# Patient Record
Sex: MALE | Race: OTHER | Employment: FULL TIME | URBAN - METROPOLITAN AREA
[De-identification: names, ages, dates, MRNs, and addresses within clinical notes are randomized per-mention and may not be internally consistent; named-entity substitution may affect disease eponyms.]

---

## 2018-09-11 ENCOUNTER — HOSPITAL ENCOUNTER (EMERGENCY)
Age: 33
Discharge: HOME | End: 2018-09-11
Attending: STUDENT IN AN ORGANIZED HEALTH CARE EDUCATION/TRAINING PROGRAM
Payer: SELF-PAY

## 2018-09-11 ENCOUNTER — APPOINTMENT (OUTPATIENT)
Dept: GENERAL RADIOLOGY | Age: 33
End: 2018-09-11
Attending: PHYSICIAN ASSISTANT
Payer: SELF-PAY

## 2018-09-11 ENCOUNTER — APPOINTMENT (OUTPATIENT)
Dept: CT IMAGING | Age: 33
End: 2018-09-11
Attending: EMERGENCY MEDICINE
Payer: SELF-PAY

## 2018-09-11 VITALS
DIASTOLIC BLOOD PRESSURE: 76 MMHG | HEART RATE: 57 BPM | OXYGEN SATURATION: 100 % | WEIGHT: 170 LBS | TEMPERATURE: 97.9 F | SYSTOLIC BLOOD PRESSURE: 128 MMHG | RESPIRATION RATE: 16 BRPM

## 2018-09-11 DIAGNOSIS — M54.50 ACUTE BILATERAL LOW BACK PAIN WITHOUT SCIATICA: Primary | ICD-10-CM

## 2018-09-11 PROCEDURE — 72131 CT LUMBAR SPINE W/O DYE: CPT

## 2018-09-11 PROCEDURE — 72100 X-RAY EXAM L-S SPINE 2/3 VWS: CPT

## 2018-09-11 PROCEDURE — 96372 THER/PROPH/DIAG INJ SC/IM: CPT

## 2018-09-11 PROCEDURE — 74011250636 HC RX REV CODE- 250/636: Performed by: EMERGENCY MEDICINE

## 2018-09-11 PROCEDURE — 99282 EMERGENCY DEPT VISIT SF MDM: CPT

## 2018-09-11 RX ORDER — KETOROLAC TROMETHAMINE 30 MG/ML
60 INJECTION, SOLUTION INTRAMUSCULAR; INTRAVENOUS
Status: COMPLETED | OUTPATIENT
Start: 2018-09-11 | End: 2018-09-11

## 2018-09-11 RX ORDER — HYDROCODONE BITARTRATE AND ACETAMINOPHEN 5; 325 MG/1; MG/1
1 TABLET ORAL
Qty: 10 TAB | Refills: 0 | Status: SHIPPED | OUTPATIENT
Start: 2018-09-11

## 2018-09-11 RX ORDER — NAPROXEN 500 MG/1
500 TABLET ORAL 2 TIMES DAILY WITH MEALS
Qty: 20 TAB | Refills: 0 | Status: SHIPPED | OUTPATIENT
Start: 2018-09-11

## 2018-09-11 RX ADMIN — KETOROLAC TROMETHAMINE 60 MG: 30 INJECTION, SOLUTION INTRAMUSCULAR at 15:47

## 2018-09-11 NOTE — LETTER
[address] 
[address] [facility] 7 [id number] 
[phone] Work/School Note Date: 9/11/2018 To Whom It May concern: Vineet Cortez was seen and treated today in the emergency room by the following provider(s): 
Attending Provider: Negro Slater MD 
Physician Assistant: Sasha Anaya PA-C. Vineet Cortez may return to work on 9/14/18.  
 
Sincerely, 
 
 
 
 
Sasha Anaya PA-C

## 2018-09-11 NOTE — DISCHARGE INSTRUCTIONS
Distensión de la espalda: Instrucciones de cuidado - [ Back Strain: Care Instructions ]  Instrucciones de cuidado    La distensión de la espalda ocurre cuando usted estira demasiado, o fuerza, un músculo de la espalda. Usted puede lesionarse la espalda en un accidente o cuando hace ejercicio o levanta algo. La mayoría de los dawna de espalda mejoran con reposo y Johnny. Usted puede cuidarse en el hogar para ayudar a que michelle espalda sane. La atención de seguimiento es darlyn parte clave de michelle tratamiento y seguridad. Asegúrese de hacer y acudir a todas las citas, y llame a michelle médico si está teniendo problemas. También es darlyn buena idea saber los resultados de chino exámenes y mantener darlyn lista de los medicamentos que james. ¿Cómo puede cuidarse en el hogar? · Trate de permanecer tan activo tony pueda, junior deje de hacer o reduzca cualquier actividad que le produzca dolor. · Colóquese hielo o darlyn compresa fría en el músculo adolorido de 10 a 20 minutos cada vez para detener la hinchazón. Trate de hacerlo cada 1 o 2 horas kashif 3 días (cuando esté despierto) o hasta que la hinchazón baje. Póngase un paño barker entre el hielo y la piel. · Después de 2 o 3 días, coloque darlyn almohadilla térmica ajustada a baja temperatura o un paño tibio sobre la espalda. Algunos médicos sugieren que se alterne entre tratamientos calientes y fríos. · [employer] analgésicos (medicamentos para el dolor) exactamente según las indicaciones. ¨ Si el médico le recetó un analgésico, tómelo según las indicaciones. ¨ Si no está tomando un analgésico recetado, pregúntele a michelle médico si puede jose jamil de [employer]. · Trate de dormir de lado con darlyn almohada entre las piernas. O, colóquese darlyn almohada debajo de las rodillas cuando se acueste [place] Estas medidas pueden aliviar el dolor en la parte baja de la espalda. · Beula Hakim a poco a michelle nivel habitual de actividades. ¿Cuándo debe pedir ayuda?   Llame al 911 en cualquier momento que considere que necesita atención de [place]. Por ejemplo, llame si:    · Es absolutamente incapaz de  darlyn pierna.    Llame a michelle médico ahora mismo o busque atención médica inmediata si:    · Tiene síntomas nuevos o peores en las piernas, el abdomen o las nalgas. Los síntomas pueden incluir:  ¨ Entumecimiento u hormigueo. ¨ Debilidad. ¨ Dolor.     · Pierde el control de la vejiga o los intestinos.    Preste especial atención a los cambios en michelle james y asegúrese de comunicarse con michelle médico si:    · Tiene fiebre, pierde peso o no se siente lavonne.     · No mejora tony se esperaba. ¿Dónde puede encontrar más información en inglés? Jean Mosher a http://eileen-melody.info/. Madan Elam L945 en la búsqueda para aprender más acerca de \"Distensión de la espalda: Instrucciones de cuidado - [ Back Strain: Care Instructions ]. \"  Revisado: 29 noviembre, 2017  Versión del contenido: 11.7  © 9574-6185 Healthwise, Incorporated. Las instrucciones de cuidado fueron adaptadas bajo licencia por [employer] (which disclaims liability or warranty for this information). Si usted tiene [place] afección médica o sobre estas instrucciones, siempre pregunte a michelle profesional de james. Healthwise, Incorporated niega toda garantía o responsabilidad por michelle uso de esta información. Dolor de espalda, síntomas urgentes o de emergencia: Instrucciones de cuidado - [ Back Pain, Emergency or Urgent Symptoms: Care Instructions ]  Instrucciones de cuidado    Muchas personas tienen dolor de espalda en algún momento. En la [employer], el dolor mejora con los cuidados personales, lo que incluye analgésicos (medicamentos para el dolor) de [employer], hielo, calor y ejercicios. A menos que tenga síntomas de darlyn lesión grave o de ataque al corazón, es posible que pueda dejar pasar algunos días antes de llamar al médico. Scarlet algunos problemas de espalda son muy graves.  No ignore los síntomas que deberían ser revisados de inmediato. La atención de seguimiento es darlyn parte clave de michelle tratamiento y seguridad. Asegúrese de hacer y acudir a todas las citas, y llame a michelle médico si está teniendo problemas. También es darlyn buena idea saber los resultados de chino exámenes y mantener darlyn lista de los medicamentos que james. ¿Cómo puede cuidarse en el hogar? · Siéntese o acuéstese en las posiciones más cómodas y que reduzcan el dolor. Pruebe darlyn de estas posiciones cuando se acueste:  ¨ Acuéstese boca arriba con las rodillas dobladas y apoyadas sobre [address]. ¨ Acuéstese en el piso con las piernas sobre el asiento de un sofá o darlyn silla. ¨ Acuéstese de lado con las rodillas [address] las caderas y [place] las piernas. ¨ Acuéstese boca abajo siempre que esta posición no empeore el dolor. · No se siente sobre la cama y evite los sillones blandos, así tony las posiciones torcidas. El reposo en cama puede aliviar el dolor al principio, junior retrasa la sanación. Evite reposar en la cama después del primer día. · Cambie de posición cada 30 minutos. Si debe sentarse por [employer], párese y descanse de estar sentado. Levántese y camine, o acuéstese en posición horizontal.  · Pruebe a usar darlyn almohadilla térmica a temperatura baja o mediana de 15 a 20 minutos cada 2 o 3 horas. Pruebe con Katarina Medina ducha tibia en vez de darlyn sesión con la almohadilla térmica. También puede comprar envolturas calientes (\"heat wraps\") desechables que keen hasta 8 horas. También puede tratar de colocarse hielo o compresas frías en la espalda de 10 a 20 minutos cada vez, varias veces al día. (Póngase un paño barker entre el hielo y la piel). [place] reduce el dolor y le será más fácil mantenerse activo y hacer ejercicio. · [employer] analgésicos exactamente según las indicaciones. ¨ Si el médico le recetó analgésicos, tómelos según las indicaciones.   ¨ Si no está tomando un analgésico recetado, pregúntele a michelle médico si puede jose jamil de [employer]. ¿Cuándo debe pedir ayuda? Llame al 911 en cualquier momento que considere que necesita atención de [place]. Por ejemplo, llame si:    · Es absolutamente incapaz de  darlyn pierna.     · Tiene dolor de espalda junto con dolor abdominal intenso.     · Tiene síntomas de un ataque al corazón. Estos pueden incluir:  ¨ Dolor o presión en el pecho, o darlyn sensación extraña en el pecho. ¨ Sudoración. ¨ Falta de aire. ¨ Náuseas o vómito. ¨ Dolor, presión o darlyn sensación extraña en la espalda, el kassandra, la mandíbula o la parte superior del abdomen, o en jamil o ambos hombros o brazos. ¨ Aturdimiento o debilidad repentina. ¨ Latidos cardíacos rápidos o irregulares. Después de llamar al 911, el operador puede decirle que [place] 1 aspirina para adultos o de 2 a 4 aspirinas de dosis baja. Espere darlyn ambulancia. No trate de conducir usted mismo.    Llame a michelle médico ahora mismo o busque atención médica inmediata si:    · Tiene síntomas nuevos o peores en los brazos, las piernas, el pecho, el abdomen o las nalgas. Los síntomas pueden incluir:  ¨ Entumecimiento u hormigueo. ¨ Debilidad. ¨ Dolor.     · Pierde el control de la vejiga o los intestinos.     · Tiene dolor de espalda y:  ¨ Se ha lesionado la espalda al levantar o al hacer alguna [place]. Llame si el dolor es intenso, no ha desaparecido después de 1 o 2 días y no puede hacer chino actividades normales diarias. ¨ Anteriormente ha tenido darlyn lesión en la espalda que requirió [place]. ¨ El dolor ha durado más de 4 [address]. ¨ Ocasio perdido peso de [place] inexplicable. ¨ Tiene fiebre. ¨ Tiene [address] ¨ Tiene cáncer o lo ocasio tenido con anterioridad.    Preste especial atención a los cambios en michelle james y asegúrese de comunicarse con michelle médico si no mejora tony se esperaba. ¿Dónde puede encontrar más información en inglés? Prince Mcbride a http://eileen-melody.info/.   [employer] [place] P220 en la búsqueda para aprender más acerca de \"Dolor de espalda, síntomas urgentes o de emergencia: Instrucciones de cuidado - [ Back Pain, Emergency or Urgent Symptoms: Care Instructions ]. \"  Revisado: 20 noviembre, 2017  Versión del contenido: 11.7  © 8845-0260 Healthwise, Incorporated. Las instrucciones de cuidado fueron adaptadas bajo licencia por [employer] (which disclaims liability or warranty for this information). Si usted tiene [place] afección médica o sobre estas instrucciones, siempre pregunte a michelle profesional de james. Healthwise, Incorporated niega toda garantía o responsabilidad por michelle uso de esta información.

## 2018-09-11 NOTE — ED TRIAGE NOTES
Pt arrives with lower back pain and L leg following injury today at work. Ambulatory. [facility] sent to ED after xray for further testing.

## 2018-09-11 NOTE — ED PROVIDER NOTES
HPI Comments: 28 y.o. male with no significant past medical history who presents from [facility] with chief complaint of back pain. Pt was pushing a machine this morning at work, ~5 hours ago, when he turned his back and felt a pinch in his back and R-leg. He is now experiencing left sided lower back pain that is exacerbated by movement as well as R-groin pain. He went to [facility] where they were concerned about a back fracture and sent the pt into the ED for further evaluation. No tx PTA. Pt denies numbness, nausea, vomiting, urinary or bowel incontinence. No abdominal pain. No blood in urine. No difficulty urinating. There are no other acute medical concerns at this time. Social hx: no tobacco use; no EtOH use Note written by Sita Anand, as dictated by ANGIE Ball 3:37 PM 
 
The history is provided by the patient and a friend. No  was used. No past medical history on file. No past surgical history on file. No family history on file. Social History Social History  Marital status: N/A Spouse name: N/A  
 Number of children: N/A  
 Years of education: N/A Occupational History  Not on file. Social History Main Topics  Smoking status: Never Smoker  Smokeless tobacco: Never Used  Alcohol use No  
 Drug use: Not on file  Sexual activity: Not on file Other Topics Concern  Not on file Social History Narrative  No narrative on file ALLERGIES: Review of patient's allergies indicates no known allergies. Review of Systems Constitutional: Negative for chills and fever. Respiratory: Negative for chest tightness and shortness of breath. Cardiovascular: Negative for chest pain. Gastrointestinal: Positive for abdominal pain (R-groin). Negative for nausea and vomiting.   
Genitourinary: Negative for decreased urine volume, difficulty urinating, dysuria, enuresis, flank pain, frequency, hematuria and urgency. Musculoskeletal: Positive for back pain. Negative for arthralgias, myalgias, neck pain and neck stiffness. Skin: Negative for rash and wound. Neurological: Negative for weakness, numbness and headaches. All other systems reviewed and are negative. Vitals:  
 09/11/18 1518 BP: 132/78 Pulse: (!) 53 Resp: 16 Temp: 97.9 °F (36.6 °C) SpO2: 99% Weight: 77.1 kg (170 lb) Physical Exam  
Constitutional: He is oriented to person, place, and time. He appears well-developed and well-nourished. HENT:  
Head: Normocephalic and atraumatic. Eyes: Conjunctivae and EOM are normal. Pupils are equal, round, and reactive to light. Neck: Normal range of motion. Neck supple. No midline tenderness to palpation of cspine. Cardiovascular: Normal rate and regular rhythm. Pulmonary/Chest: Effort normal and breath sounds normal. No respiratory distress. He has no wheezes. He exhibits no tenderness. Abdominal: Soft. Bowel sounds are normal. He exhibits no distension and no mass. There is no tenderness. There is no rebound and no guarding. No aortic bruits, No femoral bruits. 2+ femoral pulses Musculoskeletal: Normal range of motion. He exhibits no edema or tenderness. No TLS spine pain with palpation. No stepoffs, no deformity Left lumbar pain with palpation. No redness, rashes, warmth, or cellulitis. 5/5 flexion/extension of hips bilaterally 5/5 great toes strength bilaterally 5/5 dorsiflexion/plantarflexion bilaterally Straight leg raise negative. No saddle anesthesia present. Ambulatory full weight bearing. Walks without limp. Neurological: He is oriented to person, place, and time. He has normal reflexes. No cranial nerve deficit. He exhibits normal muscle tone. Coordination normal.  
Skin: Skin is warm and dry. No rash noted. No erythema. Psychiatric: He has a normal mood and affect. His behavior is normal. Judgment and thought content normal.  
Nursing note and vitals reviewed. MDM Number of Diagnoses or Management Options Acute bilateral low back pain without sciatica:  
Diagnosis management comments: 27-year-old male who presented for lower back pain after twisting while stopping at a machine. He has minimal minimal tenderness to the spine. He has no deficits on exam. He has good reflexes. He is ambulatory full weigh bearing. He is afebrile. [facility] Plan: CT and pain control Xray and CT with no acute process. Pt ambulatory full weight bearing. No fever. The patient is in overall good health. The patient denies a history of IV drug abuse, skin infections, or chronic back problems. The patient has low back pain without signs of spinal cord compression, cauda equina syndrome, infection, abscess, aneurysm, or other medically serious etiology. The patient is neurologically intact. Given the low risk of these diagnoses further testing and evaluation for these possibilities does not appear to be indicated at this time. The patient has been instructed to return if the symptoms worsen or change in any way. Standard narcotic and sedating medication warnings given Patient's results have been reviewed with them. Patient and/or family have verbally conveyed their understanding and agreement of the patient's signs, symptoms, diagnosis, treatment and prognosis and additionally agree to follow up as recommended or return to the Emergency Room should their condition change prior to follow-up. Discharge instructions have also been provided to the patient with some educational information regarding their diagnosis as well a list of reasons why they would want to return to the ER prior to their follow-up appointment should their condition change. Amount and/or Complexity of Data Reviewed Discuss the patient with other providers: yes (ER attending-Greg) Patient Progress Patient progress: stable ED Course Procedures Pt case including HPI, PE, and all available lab and radiology results has been discussed with attending physician. Opportunity to evaluate patient has been provided to ER attending. Discharge and prescription plan has been agreed upon.